# Patient Record
Sex: MALE | HISPANIC OR LATINO | URBAN - METROPOLITAN AREA
[De-identification: names, ages, dates, MRNs, and addresses within clinical notes are randomized per-mention and may not be internally consistent; named-entity substitution may affect disease eponyms.]

---

## 2018-01-10 NOTE — PROGRESS NOTES
Assessment    1  Anxiety (300 00) (F41 9)   2  Insomnia (780 52) (G47 00)   3  Depression (311) (F32 9)   4  Panic attacks (300 01) (F41 0)    Plan  Anxiety    · ALPRAZolam 0 25 MG Oral Tablet; TAKE 1 TABLET DAILY AS NEEDED  Anxiety, Depression    · Citalopram Hydrobromide 20 MG Oral Tablet; TAKE 1 TABLET EVERY DAY  Anxiety, Depression, Insomnia, Panic attacks    · Follow-up visit in 1 month Evaluation and Treatment  Follow-up  Status: Hold For -  Scheduling  Requested for: [id number]    Discussion/Summary    Rec  [facility] clinic for anxiety and depression and panic attacks  May use Xanax prn anxiety and insomnia  Rec  stress management and exercise  Increase Citalopram to 20 mg once daily  No alcohol if on meds for anxiety and depression and panic attacks  The patient was counseled regarding  Possible side effects of new medications were reviewed with the patient/guardian today  The treatment plan was reviewed with the patient/guardian  The patient/guardian understands and agrees with the treatment plan      Chief Complaint  PT is here today because he cannot sleep an entire night  Also had a panic attack last night and its been happening quite frequently  History of Present Illness  HPI: PT is here today because he cannot sleep an entire night  Also had a panic attack last night and its been happening quite frequently  Here for insomnia, anxiety, depression and is here with significant other  patient wakes up after 2 hours of sleep  Review of Systems    Constitutional: no fever or chills, feels well, no tiredness, no recent weight loss or weight gain  ENT: no complaints of earache, no loss of hearing, no nosebleeds or nasal discharge, no sore throat or hoarseness  Cardiovascular: no complaints of slow or fast heart rate, no chest pain, no palpitations, no leg claudication or lower extremity edema     Respiratory: no complaints of shortness of breath, no wheezing or cough, no dyspnea on exertion, no orthopnea or PND  Gastrointestinal: no complaints of abdominal pain, no constipation, no nausea or vomiting, no diarrhea or bloody stools  Genitourinary: no complaints of dysuria or incontinence, no hesitancy, no nocturia, no genital lesion, no inadequacy of penile erection  Musculoskeletal: no complaints of arthralgia, no myalgia, no joint swelling or stiffness, no limb pain or swelling  Integumentary: no complaints of skin rash or lesion, no itching or dry skin, no skin wounds  Neurological: no complaints of headache, no confusion, no numbness or tingling, no dizziness or fainting  Other Symptoms: anxiety depression and insomnia  Active Problems    1  Abdominal pain, epigastric (789 06) (R10 13)   2  Acute conjunctivitis of right eye (372 00) (H10 31)   3  Acute sinusitis (461 9) (J01 90)   4  ADHD (attention deficit hyperactivity disorder), combined type (314 01) (F90 2)   5  Anxiety (300 00) (F41 9)   6  Cough (786 2) (R05)   7  Depression (311) (F32 9)   8  Insomnia (780 52) (G47 00)   9  Irritable bowel syndrome (564 1) (K58 9)   10  Lactase deficiency (271 3) (E73 9)   11  Rotator cuff tendinitis (726 10) (M75 80)   12  Seasonal allergies (477 9) (J30 2)   13  Tennis elbow (726 32) (M77 10)   14  Wheezing (786 07) (R06 2)    Past Medical History    1  History of abdominal pain (V13 89) (Z87 898)   2  History of acute sinusitis (V12 69) (Z87 09)   3  History of depression (V11 8) (Z86 59)   4  History of headache (V13 89) (Z87 898)   5  History of nausea (V12 79) (Z87 898)   6  History of viral gastroenteritis (V12 09) (Z86 19)   7  History of vomiting (V13 89) (D50 806)    Family History    1  Family history of Benign Essential Hypertension    Social History    · Being A Social Drinker   · Never A Smoker    Current Meds   1  Amphetamine-Dextroamphet ER 20 MG Oral Capsule Extended Release 24 Hour; TAKE   1 CAPSULE DAILY; Therapy: 51TCH5198 to (Evaluate:79Moa0280);  Last Rx:[id number] Ordered   2  Citalopram Hydrobromide 10 MG Oral Tablet; TAKE 1 TABLET DAILY; Therapy: [id number] to (Richard Rivera)  Requested for: 15BMC0978; Last   Rx:06Jan2016 Ordered   3  Zolpidem Tartrate 10 MG Oral Tablet; TAKE 1 TABLET AT BEDTIME AS NEEDED FOR   SLEEP; Therapy: 09NXP3100 to (Evaluate:20Jan2016); Last Rx:06Jan2016 Ordered    Allergies    1  No Known Drug Allergies    2  Seasonal    Vitals   Recorded: 74KQI3611 80:31IT   Systolic 931   Diastolic 90   Weight 191 lb 4 00 oz     Physical Exam    Constitutional   General appearance: No acute distress, well appearing and well nourished  Eyes   Conjunctiva and lids: No swelling, erythema, or discharge  Pupils and irises: Equal, round and reactive to light  Ears, Nose, Mouth, and Throat   External inspection of ears and nose: Normal     Otoscopic examination: Tympanic membrance translucent with normal light reflex  Canals patent without erythema  Nasal mucosa, septum, and turbinates: Normal without edema or erythema  Oropharynx: Normal with no erythema, edema, exudate or lesions  Pulmonary   Respiratory effort: No increased work of breathing or signs of respiratory distress  Auscultation of lungs: Clear to auscultation, equal breath sounds bilaterally, no wheezes, no rales, no rhonci  Cardiovascular   Palpation of heart: Normal PMI, no thrills  Auscultation of heart: Normal rate and rhythm, normal S1 and S2, without murmurs  Examination of extremities for edema and/or varicosities: Normal     Carotid pulses: Normal     Lymphatic   Palpation of lymph nodes in neck: No lymphadenopathy  Musculoskeletal   Gait and station: Normal     Digits and nails: Normal without clubbing or cyanosis  Inspection/palpation of joints, bones, and muscles: Normal     Skin   Skin and subcutaneous tissue: Normal without rashes or lesions  Neurologic   Cranial nerves: Cranial nerves 2-12 intact  Reflexes: 2+ and symmetric      Sensation: No sensory loss  Psychiatric   Orientation to person, place and time: Normal     Mood and affect: Abnormal   anxiety          Future Appointments    Date/Time Provider Specialty Site   02/09/2016 01:00 PM Turner Juan DO Family Medicine [facility]   02/16/2016 03:30 PM Turner Juan DO Family Medicine [facility]     Signatures   Electronically signed by : Marcus Landaverde DO; Jan 19 2016  4:50PM EST                       (Author)

## 2018-01-12 NOTE — MISCELLANEOUS
Message  Return to work or school:   Santo Atwood is under my professional care  He was seen in my office on 01/19/2016   He is able to return to work on  01/20/2016      Lea Johns was out of work on 01/18/2016 and 01/19/2016     Shay Pearson Do/am       Signatures   Electronically signed by : Contreras Javier, ; Jan 19 2016  4:30PM EST                       (Author)

## 2018-08-23 RX ORDER — ALBUTEROL SULFATE 90 UG/1
2 AEROSOL, METERED RESPIRATORY (INHALATION)
COMMUNITY
Start: 2016-09-06 | End: 2019-04-23

## 2018-08-23 RX ORDER — TOBRAMYCIN 3 MG/ML
1 SOLUTION/ DROPS OPHTHALMIC
COMMUNITY
Start: 2016-10-05 | End: 2018-08-24 | Stop reason: ALTCHOICE

## 2018-08-23 RX ORDER — DEXTROAMPHETAMINE SACCHARATE, AMPHETAMINE ASPARTATE MONOHYDRATE, DEXTROAMPHETAMINE SULFATE AND AMPHETAMINE SULFATE 5; 5; 5; 5 MG/1; MG/1; MG/1; MG/1
1 CAPSULE, EXTENDED RELEASE ORAL DAILY
COMMUNITY
Start: 2015-06-01 | End: 2019-04-23

## 2018-08-24 ENCOUNTER — OFFICE VISIT (OUTPATIENT)
Dept: FAMILY MEDICINE CLINIC | Facility: CLINIC | Age: 38
End: 2018-08-24
Payer: COMMERCIAL

## 2018-08-24 ENCOUNTER — TRANSCRIBE ORDERS (OUTPATIENT)
Dept: LAB | Facility: CLINIC | Age: 38
End: 2018-08-24

## 2018-08-24 ENCOUNTER — APPOINTMENT (OUTPATIENT)
Dept: LAB | Facility: CLINIC | Age: 38
End: 2018-08-24
Payer: COMMERCIAL

## 2018-08-24 VITALS
DIASTOLIC BLOOD PRESSURE: 96 MMHG | HEIGHT: 69 IN | WEIGHT: 175.6 LBS | BODY MASS INDEX: 26.01 KG/M2 | SYSTOLIC BLOOD PRESSURE: 152 MMHG

## 2018-08-24 DIAGNOSIS — R21 RASH: ICD-10-CM

## 2018-08-24 DIAGNOSIS — R21 RASH: Primary | ICD-10-CM

## 2018-08-24 DIAGNOSIS — Z20.2 STD EXPOSURE: Primary | ICD-10-CM

## 2018-08-24 DIAGNOSIS — H00.014 HORDEOLUM EXTERNUM OF LEFT UPPER EYELID: ICD-10-CM

## 2018-08-24 DIAGNOSIS — Z20.2 STD EXPOSURE: ICD-10-CM

## 2018-08-24 LAB
ALBUMIN SERPL BCP-MCNC: 4.2 G/DL (ref 3.5–5)
ALP SERPL-CCNC: 65 U/L (ref 46–116)
ALT SERPL W P-5'-P-CCNC: 56 U/L (ref 12–78)
ANION GAP SERPL CALCULATED.3IONS-SCNC: 5 MMOL/L (ref 4–13)
AST SERPL W P-5'-P-CCNC: 15 U/L (ref 5–45)
BASOPHILS # BLD AUTO: 0.02 THOUSANDS/ΜL (ref 0–0.1)
BASOPHILS NFR BLD AUTO: 0 % (ref 0–1)
BILIRUB SERPL-MCNC: 0.45 MG/DL (ref 0.2–1)
BUN SERPL-MCNC: 15 MG/DL (ref 5–25)
CALCIUM SERPL-MCNC: 9 MG/DL (ref 8.3–10.1)
CHLORIDE SERPL-SCNC: 105 MMOL/L (ref 100–108)
CO2 SERPL-SCNC: 28 MMOL/L (ref 21–32)
CREAT SERPL-MCNC: 1.13 MG/DL (ref 0.6–1.3)
EOSINOPHIL # BLD AUTO: 0.11 THOUSAND/ΜL (ref 0–0.61)
EOSINOPHIL NFR BLD AUTO: 2 % (ref 0–6)
ERYTHROCYTE [DISTWIDTH] IN BLOOD BY AUTOMATED COUNT: 12.5 % (ref 11.6–15.1)
GFR SERPL CREATININE-BSD FRML MDRD: 83 ML/MIN/1.73SQ M
GLUCOSE SERPL-MCNC: 97 MG/DL (ref 65–140)
HCT VFR BLD AUTO: 49.4 % (ref 36.5–49.3)
HGB BLD-MCNC: 17.5 G/DL (ref 12–17)
IMM GRANULOCYTES # BLD AUTO: 0.01 THOUSAND/UL (ref 0–0.2)
IMM GRANULOCYTES NFR BLD AUTO: 0 % (ref 0–2)
LYMPHOCYTES # BLD AUTO: 1.97 THOUSANDS/ΜL (ref 0.6–4.47)
LYMPHOCYTES NFR BLD AUTO: 30 % (ref 14–44)
MCH RBC QN AUTO: 30.6 PG (ref 26.8–34.3)
MCHC RBC AUTO-ENTMCNC: 35.4 G/DL (ref 31.4–37.4)
MCV RBC AUTO: 86 FL (ref 82–98)
MONOCYTES # BLD AUTO: 0.36 THOUSAND/ΜL (ref 0.17–1.22)
MONOCYTES NFR BLD AUTO: 5 % (ref 4–12)
NEUTROPHILS # BLD AUTO: 4.14 THOUSANDS/ΜL (ref 1.85–7.62)
NEUTS SEG NFR BLD AUTO: 63 % (ref 43–75)
NRBC BLD AUTO-RTO: 0 /100 WBCS
PLATELET # BLD AUTO: 242 THOUSANDS/UL (ref 149–390)
PMV BLD AUTO: 10.8 FL (ref 8.9–12.7)
POTASSIUM SERPL-SCNC: 4.2 MMOL/L (ref 3.5–5.3)
PROT SERPL-MCNC: 7.8 G/DL (ref 6.4–8.2)
RBC # BLD AUTO: 5.72 MILLION/UL (ref 3.88–5.62)
SODIUM SERPL-SCNC: 138 MMOL/L (ref 136–145)
WBC # BLD AUTO: 6.61 THOUSAND/UL (ref 4.31–10.16)

## 2018-08-24 PROCEDURE — 85025 COMPLETE CBC W/AUTO DIFF WBC: CPT

## 2018-08-24 PROCEDURE — 87491 CHLMYD TRACH DNA AMP PROBE: CPT

## 2018-08-24 PROCEDURE — 80074 ACUTE HEPATITIS PANEL: CPT

## 2018-08-24 PROCEDURE — 1036F TOBACCO NON-USER: CPT | Performed by: FAMILY MEDICINE

## 2018-08-24 PROCEDURE — 3008F BODY MASS INDEX DOCD: CPT | Performed by: FAMILY MEDICINE

## 2018-08-24 PROCEDURE — 86592 SYPHILIS TEST NON-TREP QUAL: CPT

## 2018-08-24 PROCEDURE — 87591 N.GONORRHOEAE DNA AMP PROB: CPT

## 2018-08-24 PROCEDURE — 87389 HIV-1 AG W/HIV-1&-2 AB AG IA: CPT

## 2018-08-24 PROCEDURE — 36415 COLL VENOUS BLD VENIPUNCTURE: CPT

## 2018-08-24 PROCEDURE — 80053 COMPREHEN METABOLIC PANEL: CPT

## 2018-08-24 PROCEDURE — 86695 HERPES SIMPLEX TYPE 1 TEST: CPT

## 2018-08-24 PROCEDURE — 86696 HERPES SIMPLEX TYPE 2 TEST: CPT

## 2018-08-24 PROCEDURE — 99214 OFFICE O/P EST MOD 30 MIN: CPT | Performed by: FAMILY MEDICINE

## 2018-08-24 NOTE — PROGRESS NOTES
Assessment/Plan:  Chief Complaint   Patient presents with    Rash     3 weeks ago switched from [facility] to [address] [place] [address] and after a few uses broke out with pimples and had a blister on the L upper eyelid   Follow-up    ADHD     has been off of medication however does need it  Would like to discuss restarting Adderall  Patient Instructions   Here for rash opn hands and also possible STD exposure  He has no penile lesions or discharge  Consult Dermatologist as directed for hand rash and rec  Proper STD precautions as discussed  Check labs for hx of possible STD exposure  No problem-specific Assessment & Plan notes found for this encounter  Diagnoses and all orders for this visit:    Rash  -     Comprehensive metabolic panel; Future  -     CBC and differential; Future  -     Herpes I /II IgM Ab Indirect; Future  -     HSV Type 2-Specific Ab, IgG; Future  -     HSV Type 1-Specific Ab, IgG; Future  -     HIV 1/2 AG-AB combo; Future  -     RPR; Future  -     Hepatitis panel, acute; Future  -     Cancel: Chlamydia/GC amplified DNA by PCR; Future    Hordeolum externum of left upper eyelid  -     Comprehensive metabolic panel; Future  -     CBC and differential; Future  -     Herpes I /II IgM Ab Indirect; Future  -     HSV Type 2-Specific Ab, IgG; Future  -     HSV Type 1-Specific Ab, IgG; Future  -     HIV 1/2 AG-AB combo; Future  -     RPR; Future  -     Hepatitis panel, acute; Future  -     Cancel: Chlamydia/GC amplified DNA by PCR; Future    STD exposure  -     Comprehensive metabolic panel; Future  -     CBC and differential; Future  -     Herpes I /II IgM Ab Indirect; Future  -     HSV Type 2-Specific Ab, IgG; Future  -     HSV Type 1-Specific Ab, IgG; Future  -     HIV 1/2 AG-AB combo; Future  -     RPR; Future  -     Hepatitis panel, acute; Future  -     Cancel: Chlamydia/GC amplified DNA by PCR; Future  -     Chlamydia/GC amplified DNA by PCR;  Future          Subjective:      Patient ID: Ronnie Lau Lan Rojas is a 41 y o  male  About 6 months ago had sex with a woman he knew and about 3 months ago started to have break out on hands with blisters and she told him ex  gave her something and was cured  Pt  Did use a condom and had a rash that came back 2 weeks ago and recently used soap was different and broke out in "zits"  Has blister on left eye  He thought it was a stye or an ingrown hair  The following portions of the patient's history were reviewed and updated as appropriate: allergies, current medications, past family history, past medical history, past social history, past surgical history and problem list     Review of Systems   Constitutional: Negative  HENT: Negative  Eyes: Negative  Respiratory: Negative  Cardiovascular: Negative  Gastrointestinal: Negative  Endocrine: Negative  Genitourinary: Negative  Musculoskeletal: Negative  Skin:        Left upper eyelid stye   Allergic/Immunologic: Negative  Neurological: Negative  Hematological: Negative  Psychiatric/Behavioral: Negative  Objective:      /96   Ht 5' 8 5" (1 74 m)   Wt 79 7 kg (175 lb 9 6 oz)   BMI 26 31 kg/m²          Physical Exam   Constitutional: He is oriented to person, place, and time  He appears well-developed and well-nourished  HENT:   Head: Normocephalic and atraumatic  Right Ear: External ear normal    Left Ear: External ear normal    Nose: Nose normal    Mouth/Throat: Oropharynx is clear and moist    Eyes: Conjunctivae and EOM are normal  Pupils are equal, round, and reactive to light  Left upper eyelid stye   Neck: Normal range of motion  Neck supple  Cardiovascular: Normal rate, regular rhythm, normal heart sounds and intact distal pulses  Pulmonary/Chest: Effort normal and breath sounds normal    Musculoskeletal: Normal range of motion  Neurological: He is alert and oriented to person, place, and time  He has normal reflexes     Skin: Skin is warm and dry  Psychiatric: He has a normal mood and affect   His behavior is normal

## 2018-08-24 NOTE — PATIENT INSTRUCTIONS
Here for rash opn hands and also possible STD exposure  He has no penile lesions or discharge  Consult Dermatologist as directed for hand rash and rec  Proper STD precautions as discussed  Check labs for hx of possible STD exposure

## 2018-08-25 LAB
HAV IGM SER QL: NORMAL
HBV CORE IGM SER QL: NORMAL
HBV SURFACE AG SER QL: NORMAL
HCV AB SER QL: NORMAL
RPR SER QL: NORMAL

## 2018-08-27 LAB
CHLAMYDIA DNA CVX QL NAA+PROBE: NORMAL
HIV 1+2 AB+HIV1 P24 AG SERPL QL IA: NORMAL
N GONORRHOEA DNA GENITAL QL NAA+PROBE: NORMAL

## 2018-08-28 LAB
HSV1 IGM TITR SER IF: NORMAL TITER
HSV2 IGM TITR SER IF: NORMAL TITER

## 2019-04-23 ENCOUNTER — OFFICE VISIT (OUTPATIENT)
Dept: FAMILY MEDICINE CLINIC | Facility: CLINIC | Age: 39
End: 2019-04-23
Payer: COMMERCIAL

## 2019-04-23 VITALS
HEIGHT: 69 IN | WEIGHT: 188.2 LBS | BODY MASS INDEX: 27.88 KG/M2 | DIASTOLIC BLOOD PRESSURE: 94 MMHG | SYSTOLIC BLOOD PRESSURE: 142 MMHG

## 2019-04-23 DIAGNOSIS — F41.0 PANIC DISORDER: Primary | ICD-10-CM

## 2019-04-23 DIAGNOSIS — F41.9 ANXIETY: ICD-10-CM

## 2019-04-23 DIAGNOSIS — E66.3 OVERWEIGHT (BMI 25.0-29.9): ICD-10-CM

## 2019-04-23 DIAGNOSIS — R03.0 ELEVATED BP WITHOUT DIAGNOSIS OF HYPERTENSION: ICD-10-CM

## 2019-04-23 PROCEDURE — 99214 OFFICE O/P EST MOD 30 MIN: CPT | Performed by: FAMILY MEDICINE

## 2019-04-23 PROCEDURE — 3008F BODY MASS INDEX DOCD: CPT | Performed by: FAMILY MEDICINE

## 2019-04-23 PROCEDURE — 1036F TOBACCO NON-USER: CPT | Performed by: FAMILY MEDICINE

## 2019-04-23 RX ORDER — METHYLPREDNISOLONE 4 MG/1
TABLET ORAL
Refills: 0 | COMMUNITY
Start: 2019-03-15 | End: 2019-04-23

## 2019-04-23 RX ORDER — CEPHALEXIN 500 MG/1
CAPSULE ORAL
Refills: 0 | COMMUNITY
Start: 2019-03-15 | End: 2019-04-23

## 2019-04-23 RX ORDER — FLUOXETINE 10 MG/1
10 TABLET, FILM COATED ORAL DAILY
Qty: 30 TABLET | Refills: 5 | Status: SHIPPED | OUTPATIENT
Start: 2019-04-23 | End: 2019-05-08

## 2019-04-23 RX ORDER — ALPRAZOLAM 0.25 MG/1
0.25 TABLET ORAL
Qty: 10 TABLET | Refills: 0 | Status: SHIPPED | OUTPATIENT
Start: 2019-04-23 | End: 2019-05-08 | Stop reason: SDUPTHER

## 2019-05-07 LAB — HSV1 IGG SER IA-ACNC: NORMAL

## 2019-05-08 ENCOUNTER — TELEPHONE (OUTPATIENT)
Dept: FAMILY MEDICINE CLINIC | Facility: CLINIC | Age: 39
End: 2019-05-08

## 2019-05-08 DIAGNOSIS — F41.9 ANXIETY: ICD-10-CM

## 2019-05-08 RX ORDER — ALPRAZOLAM 0.25 MG/1
0.25 TABLET ORAL
Qty: 30 TABLET | Refills: 0 | Status: SHIPPED | OUTPATIENT
Start: 2019-05-08

## 2019-05-08 RX ORDER — FLUOXETINE 20 MG/1
20 TABLET, FILM COATED ORAL DAILY
Qty: 30 TABLET | Refills: 3 | Status: SHIPPED | OUTPATIENT
Start: 2019-05-08

## 2021-02-03 ENCOUNTER — NURSE TRIAGE (OUTPATIENT)
Dept: OTHER | Facility: OTHER | Age: 41
End: 2021-02-03

## 2021-02-03 DIAGNOSIS — Z20.822 EXPOSURE TO COVID-19 VIRUS: Primary | ICD-10-CM

## 2021-02-03 NOTE — TELEPHONE ENCOUNTER
Regarding: Covid Exposed Symptomatic SOB  ----- Message from [facility] sent at 2/3/2021  2:42 PM EST -----  "My girlfriend tested positive & I am now sick & need to get tested"    She tested positive over the weekend      His symptoms are: Fever, body aches, chills and SOB

## 2021-02-03 NOTE — TELEPHONE ENCOUNTER
Pt c/o moderate SOB with activity, fever 101 5 oral last night, body aches, headaches, and chills  Denies chest pain/pressure or SOB at rest  Pt able to speak in full sentences without difficulty  No distress noted over phone  Pt advised to go to UC/ED per protocol  Pt declines advice at this time and would like to be tested  Pt advised to go to ED if SOB worsens, pt verbalizes understanding  Pt is requesting a covid test and does not have a [facility]'s PCP  Reports having an out of network PCP  Order placed  Pt informed of closest testing site and was advised of hours of operation, address, to wear a mask, and to stay in the car  Pt verbalized understanding  Link sent to pts email address to create a [email or web address] account to check for results  Reason for Disposition   MILD difficulty breathing (e g , minimal/no SOB at rest, SOB with walking, pulse <100)    Answer Assessment - Initial Assessment Questions  Were you within 6 feet or less, for up to 15 minutes or more with a person that has a confirmed COVID-19 test? Yes     What was the date of your exposure?  Lives with girlfriend who tested positive     Are you experiencing any symptoms attributed to the virus?  (Assess for SOB, cough, fever, difficulty breathing) fever 101 5 oral last night, body aches, headaches, chills, moderate shortness of breath with activity     Denies: shortness of breath at rest, chest pain, or chest pressure     HIGH RISK: Do you have any history heart or lung conditions, weakened immune system, diabetes, Asthma, CHF, HIV, COPD, Chemo, renal failure, sickle cell, etc? Asthma    Protocols used: CORONAVIRUS (COVID-19) DIAGNOSED OR SUSPECTED-ADULT-[place]

## 2021-02-04 DIAGNOSIS — Z20.822 EXPOSURE TO COVID-19 VIRUS: ICD-10-CM

## 2021-02-04 PROCEDURE — U0005 INFEC AGEN DETEC AMPLI PROBE: HCPCS | Performed by: FAMILY MEDICINE

## 2021-02-04 PROCEDURE — U0003 INFECTIOUS AGENT DETECTION BY NUCLEIC ACID (DNA OR RNA); SEVERE ACUTE RESPIRATORY SYNDROME CORONAVIRUS 2 (SARS-COV-2) (CORONAVIRUS DISEASE [COVID-19]), AMPLIFIED PROBE TECHNIQUE, MAKING USE OF HIGH THROUGHPUT TECHNOLOGIES AS DESCRIBED BY CMS-2020-01-R: HCPCS | Performed by: FAMILY MEDICINE

## 2021-02-05 ENCOUNTER — TELEPHONE (OUTPATIENT)
Dept: OTHER | Facility: OTHER | Age: 41
End: 2021-02-05

## 2021-02-05 LAB — SARS-COV-2 RNA RESP QL NAA+PROBE: POSITIVE

## 2021-02-05 NOTE — RESULT ENCOUNTER NOTE
I spoke with Abisai Guzman and let him know that his COVID-19 swab was positive  Continue symptomatic treatment  Advised he implement home isolation measures including:    Staying home  Stay in a specific "sick room" or area and away from other people or animals, including pets  Wear a mask when leaving your room  Use a separate bathroom, if available  Wipe down all commonly touched surfaces with household   Please contact your PCP to establish an appointment to monitor your symptoms and disease progression, as well as to obtain further information on ending home isolation and returning to work

## 2021-02-05 NOTE — TELEPHONE ENCOUNTER
Your test for the novel coronavirus, also known as COVID-19, was positive  The sample showed that the virus was present  Positive COVID-19 test results are reportable to the [place] Department of Health  You may receive a call from trained public health staff to conduct an interview  It is important to answer their call  They will ask you to verify who you are  During the call they will ask you about what symptoms you have, what you did before you got sick, and who you were close to while sick  The health department does this to make sure everyone stays healthy and to reduce the spread of the virus  If you would like to verify if the caller does in fact work in contact tracing, call the [address] [place] [address] at [employer] ([phone])  For additional information, please visit the [facility] website: www [employer] [place] gov     If you have any additional questions, we can schedule a virtual visit for you with a provider or call the [facility] hotline [phone], option 7, for care advice  You should also contact your PCP for further care advice and information on when you can end home isolation  For additional information, please visit the Coronavirus FAQ on the [place] home page ([facility])